# Patient Record
(demographics unavailable — no encounter records)

---

## 2024-12-10 NOTE — PLAN
[FreeTextEntry1] : 52 year old female w/ PMH of obesity BMI > 40, presents for follow up after initiating Zepbound.  Patient is tolerating medication well and has no side effects. she is s/p 3 doses.  No significant weight loss as of yet.  Denies any abdominal pain, cramps, bloating or mood changes.    Obesity with BMI 33 >will get blood work w/ in the next 3-4 weeks to ensure kidneys and liver in good health.  > continue with Zepbound at increased dose of 0.5mg weekly for the next 2 months.  > patient to return to clinic in 2 months.  I will follow up blood work and message patient.

## 2024-12-10 NOTE — END OF VISIT
[Time Spent: ___ minutes] : I have spent [unfilled] minutes of time on the encounter which excludes teaching and separately reported services. [TextEntry] :  Time spent: reviewing patient chart, medical history, past charting review 10 mins interviewing patient and obtaining HPI, exam 15 mins final charting  8 mins

## 2024-12-10 NOTE — HISTORY OF PRESENT ILLNESS
[FreeTextEntry1] : follow up for obesity medication [de-identified] : 52 year old female w/ PMH of obesity BMI 33, presents for follow up after initiating Zepbound.  Patient is tolerating medication well and has no side effects. she is s/p 3 doses.  No significant weight loss as of yet.  Denies any abdominal pain, cramps, bloating or mood changes.  ROS negative.

## 2025-04-15 NOTE — PLAN
[FreeTextEntry1] :     52-year-old female follow-up for obesity and Zepbound, tolerating very well.  Has been following up regularly for blood work. Patient has had no side effects with Zepbound she has been tolerating medication very well. She has lost nearly 20 pounds.  She has increased energy, and also is sleeping better. She has no complaints to endorse today.  All other ROS is negative.   Obesity >> Continue with 7 pound >> Has lost 22 pounds >> Increased energy feels good >> Continue with 7.5 mg a pound >> Will order blood work today.  If blood work is normal patient to return to clinic in 6 months.

## 2025-04-15 NOTE — HISTORY OF PRESENT ILLNESS
[de-identified] : 52-year-old female follow-up for obesity and Zepbound, tolerating very well.  Has been following up regularly for blood work. Patient has had no side effects with Zepbound she has been tolerating medication very well. She has lost nearly 20 pounds.  She has increased energy, and also is sleeping better. She has no complaints to endorse today.  All other ROS is negative.

## 2025-06-27 NOTE — HISTORY OF PRESENT ILLNESS
[FreeTextEntry1] : follow up for obesity/ weight loss medication [de-identified] : 52-year-old female past medical history of obesity, hyperlipidemia presents for follow-up regarding weight loss medication obesity.  Patient has been on Zepbound for approximately 7 to 8 months.  We have been increasing her dose over the past few months to facilitate her desired weight loss.  She has been tolerating medications very well.  She is currently on 7.5 mg of Zepbound.  She has continued to lose weight on the slow consistent level.  Last time she was here for blood work her lipase was slightly elevated.  She has no complaints to endorse today all other ROS is negative.

## 2025-06-27 NOTE — REVIEW OF SYSTEMS
[Patient Intake Form Reviewed] : Patient intake form was reviewed [FreeTextEntry1] : All other ROS is negative other than what is in HPI

## 2025-06-27 NOTE — PLAN
[FreeTextEntry1] :    52-year-old female past medical history of obesity, hyperlipidemia presents for follow-up regarding weight loss medication obesity.  Patient has been on Zepbound for approximately 7 to 8 months.  We have been increasing her dose over the past few months to facilitate her desired weight loss.  She has been tolerating medications very well.  She is currently on 7.5 mg of Zepbound.  She has continued to lose weight on the slow consistent level.  Last time she was here for blood work her lipase was slightly elevated.  She has no complaints to endorse today all other ROS is negative.   Obesity currently on weight loss medication, Zepbound >> Tolerating dose well currently on 7.5 mg.  Patient desires to continue with this dose right now and attempt to lose few more pounds.  She would like to eventually come to a lowered dose in the near future >> Will order basic blood work CMP, lipase amylase  >> Will add on lipid panel as patient has not been taking her statin therapy.  It is likely that her cholesterol will be low due to significant amount of weight loss therefore no urgency in her starting medications while waiting for blood work